# Patient Record
Sex: MALE | URBAN - METROPOLITAN AREA
[De-identification: names, ages, dates, MRNs, and addresses within clinical notes are randomized per-mention and may not be internally consistent; named-entity substitution may affect disease eponyms.]

---

## 2019-09-10 ENCOUNTER — OFFICE VISIT (OUTPATIENT)
Dept: DERMATOLOGY | Facility: CLINIC | Age: 24
End: 2019-09-10
Payer: COMMERCIAL

## 2019-09-10 DIAGNOSIS — L73.0 ACNE NUCHAE KELOIDALIS: Primary | ICD-10-CM

## 2019-09-10 RX ORDER — CLOBETASOL PROPIONATE 0.5 MG/ML
SOLUTION TOPICAL 2 TIMES DAILY
Qty: 50 ML | Refills: 1 | Status: SHIPPED | OUTPATIENT
Start: 2019-09-10

## 2019-09-10 RX ORDER — MINOCYCLINE HYDROCHLORIDE 100 MG/1
100 CAPSULE ORAL 2 TIMES DAILY
Qty: 60 CAPSULE | Refills: 1 | Status: SHIPPED | OUTPATIENT
Start: 2019-09-10

## 2019-09-10 NOTE — PROGRESS NOTES
Teledermatology Consult Note    Image quality and interpretability: acceptable    In-person dermatology visit recommendation: after treatment to ensure response - 1 month    Impression and Recommendations:  Diagnosis 1: Acne keloidalis nuchae  - Start minocycline 100mg BID for 1 month  - Start clobetasol 0.05% solution BID; tapering to daily as condition responds    Patient counseling:  This condition is called acne keloidalis nuchae - it is a form of acne that results in small scars and can eventually cause hair loss on the affected part of the scalp. We will start treatment right away, but want to follow up with your to ensure that we avoid those outcomes. The best treatment consists of an oral antibiotic called minocycline combined with a topical steroid called clobetasol solution. The antibiotic will likely only be a one month commitment, but the steroid may be needed on and off for a long time, as this condition can be chronic. Once it is better under control we may use a topical antibiotic clindamycin, which is safe to use long term.     Follow-up:   Follow-up with dermatology in approximately 1 month. Please, have the patient contact the clinic at [phone] and make an appointment and indicate they are a teledermatology patient.     Thank you for the opportunity be involved in the care of this patient.     Staff:  Jarod Knott MD  Dermatology/Dermatopathology Staff Physician  , Department of Dermatology    _____________________________________________________________________________    Dermatology Problem List:  New patient    Encounter Date: Sep 10, 2019    CC:   Chief Complaint   Patient presents with     Allied Health Visit     telederm       History of Present Illness:  I have reviewed the teledermatology consult information and the referring clinician s clinic note corresponding to this issue. Mayank Weber is a 24 year old male who presents as a teledermatology consult for  "the following:    Auto-populate boxes from the consult order:     When did you notice the rash? 1 year ago      Where did it start - then where did it spread to? Started at nape of neck and going up side of scalp.  Before the rash, had you been sick? No  Had you taken any prescription meds within a month of the rash, if so what? No  Does it itch and if so how bad? Yes moderate  Do you feel sick? No  What did you put on the rash after it started to bother you? \"like a rubbing alcohol\"  Do any of your friends or family members have a similar rash? No  Have you had any kind of skin problems in the past? Yes when born had eczema  Does sensitive skin run in your family? No  Do severe allergies run in your family? No  What do you think might be causing the rash? States [employer] used a rusted razor on him.  Have you noticed any water blisters with the rash? none bumps not filled with fluid  Diameter of some of the spots (range i.e. 2 to 5 mm):   All sites with involvement: nape of neck and scalp    Additional comments and observations from review of the patient s chart including the following:    No additional chart history in this system or Care Everywhere    Physical Examination:  General: Well-appearing, appropriately-developed individual.  Skin: Focused examination of the posterior scalp within the teledermatology photograph(s) was performed.   - scattered folliculocentric papules and pustules with keloidal changes and alopecia on the occiput    Past Medical History:   There is no problem list on file for this patient.    No past medical history on file.  No past surgical history on file.    Social History:  Social History     Socioeconomic History     Marital status: Single     Spouse name: Not on file     Number of children: Not on file     Years of education: Not on file     Highest education level: Not on file   Occupational History     Not on file   Social Needs     Financial resource strain: Not on file     " Food insecurity:     Worry: Not on file     Inability: Not on file     Transportation needs:     Medical: Not on file     Non-medical: Not on file   Tobacco Use     Smoking status: Not on file   Substance and Sexual Activity     Alcohol use: Not on file     Drug use: Not on file     Sexual activity: Not on file   Lifestyle     Physical activity:     Days per week: Not on file     Minutes per session: Not on file     Stress: Not on file   Relationships     Social connections:     Talks on phone: Not on file     Gets together: Not on file     Attends Hinduism service: Not on file     Active member of club or organization: Not on file     Attends meetings of clubs or organizations: Not on file     Relationship status: Not on file     Intimate partner violence:     Fear of current or ex partner: Not on file     Emotionally abused: Not on file     Physically abused: Not on file     Forced sexual activity: Not on file   Other Topics Concern     Not on file   Social History Narrative     Not on file       Family History:  No family history on file.    Medications:  No current outpatient medications on file.     No current facility-administered medications for this visit.           Allergies not on file    _____________________________________________________________________________    Teledermatology information:  - Location of patient: [facility],  Dermatology Clinic, [address], [place] [address])  - Patient presented in referral from: self   - Location of teledermatologist: (Dr. Knott's office - [address] [place], [place] [address])  - Reason teledermatology is appropriate: patient unable to obtain appointment for acute issue (next clinic opening too far away), desires expedited evaluation  - The patient's condition can safely be assessed using telemedicine: yes  - Method of transmission: store and forward teledermatology  - Date of images: 09/10/19  - Date of report: 09/10/19

## 2019-09-10 NOTE — PROGRESS NOTES
"When did you notice the rash? 1 year ago      Where did it start - then where did it spread to? Started at nape of neck and going up side of scalp.  Before the rash, had you been sick? No  Had you taken any prescription meds within a month of the rash, if so what? No  Does it itch and if so how bad? Yes moderate  Do you feel sick? No  What did you put on the rash after it started to bother you? \"like a rubbing alcohol\"  Do any of your friends or family members have a similar rash? No  Have you had any kind of skin problems in the past? Yes when born had eczema  Does sensitive skin run in your family? No  Do severe allergies run in your family? No  What do you think might be causing the rash? [employer] used a rusted razor on him.  Have you noticed any water blisters with the rash? none bumps not filled with fluid  Diameter of some of the spots (range i.e. 2 to 5 mm):   All sites with involvement: nape of neck and scalp                         "

## 2019-09-11 ENCOUNTER — TELEPHONE (OUTPATIENT)
Dept: DERMATOLOGY | Facility: CLINIC | Age: 24
End: 2019-09-11

## 2019-09-11 NOTE — TELEPHONE ENCOUNTER
Pt given Dr Knott' recommendations. States understanding and has no questions at this time. Agrees to contact clinic with any concerns.

## 2019-09-24 ENCOUNTER — OFFICE VISIT (OUTPATIENT)
Dept: INTERNAL MEDICINE | Facility: CLINIC | Age: 24
End: 2019-09-24
Payer: COMMERCIAL

## 2019-09-24 VITALS
OXYGEN SATURATION: 98 % | SYSTOLIC BLOOD PRESSURE: 122 MMHG | HEART RATE: 74 BPM | WEIGHT: 173.7 LBS | DIASTOLIC BLOOD PRESSURE: 84 MMHG

## 2019-09-24 DIAGNOSIS — Z00.00 HEALTH MAINTENANCE EXAMINATION: Primary | ICD-10-CM

## 2019-09-24 DIAGNOSIS — R42 LIGHT-HEADEDNESS: ICD-10-CM

## 2019-09-24 DIAGNOSIS — G44.209 TENSION HEADACHE: ICD-10-CM

## 2019-09-24 SDOH — HEALTH STABILITY: MENTAL HEALTH: HOW OFTEN DO YOU HAVE A DRINK CONTAINING ALCOHOL?: NEVER

## 2019-09-24 ASSESSMENT — PAIN SCALES - GENERAL: PAINLEVEL: NO PAIN (0)

## 2019-09-24 NOTE — NURSING NOTE
Mayank Weber      1.  Has the patient received the information for the influenza vaccine? YES    2.  Does the patient have any of the following contraindications?     Allergy to eggs? No     Allergy to a component of the influenza vaccine? No     Serious reaction to previous influenza vaccines? No     Paralyzed by Guillain-Millbrook syndrome? No     Currently sick today? No         3.  Verified patient name and  prior to injection. Yes  4.  The patient was instructed to wait 15 minutes before leaving the building in the event of an allergic reaction: YES        Vaccination given by LOURDES Hutchins at 10:53 AM sign on 2019      Recorded by Norma Abrams [facility]

## 2019-09-24 NOTE — NURSING NOTE
Chief Complaint   Patient presents with     Establish Care     Pt here to establish care and for a general physical      LOURDES Hutchins at 7:08 AM sign on 9/24/2019

## 2019-09-24 NOTE — PROGRESS NOTES
"  PRIMARY CARE CENTER         HPI:       Mayank Weber is a 24 year old male with no PMH presents to establish care.     Patient is working as an  for [employer] in [employer] in [place]. Made the switch to night time schedule June and started having a lot of headaches and light headedness recently. Hoping to quit [employer] job completely this week, unfortunately would lose his insurance.  Also works part time as a /research analyst in [employer] and hoping this will be moved to a full time position.    Reports headaches upon waking up and has throbbing sensation in head which improves when he drinks water. Drinks 1 cup of tea a day. Does not drink coffee. Feels like he doesn't eat enough. Doesn't think he is drinking enough water. Reports light headedness at the same time when he wakes up. Usually goes away when he drinks water. Has never fainted but has had a few times of feeling light headed when he stands up too quickly.     Has not had chest pain but has had some irregular heart beats. Recently has been happening \"more\". Unsure if associated with light headedness.     Insurance will run out when he quits his job this week.         ROS:     Constitutional, neuro, ENT, endocrine, pulmonary, cardiac, gastrointestinal, genitourinary, musculoskeletal, integument and psychiatric systems are negative, except as otherwise noted.    Problem, Medication and Allergy Lists were reviewed and are current.  Patient is a new patient to this clinic and so  I reviewed/updated the Past Medical History, the Family History and the Social History.     Past Medical History:   Diagnosis Date     Asthma in child      No Known Allergies    History reviewed. No pertinent surgical history.    Family History   Problem Relation Age of Onset     Diabetes Mother      Asthma Maternal Grandfather        Social History     Socioeconomic History     Marital status: Single     Spouse name: Not on file     " Number of children: Not on file     Years of education: Not on file     Highest education level: Not on file   Occupational History     Not on file   Social Needs     Financial resource strain: Not on file     Food insecurity:     Worry: Not on file     Inability: Not on file     Transportation needs:     Medical: Not on file     Non-medical: Not on file   Tobacco Use     Smoking status: Not on file   Substance and Sexual Activity     Alcohol use: Not on file     Drug use: Not on file     Sexual activity: Not on file   Lifestyle     Physical activity:     Days per week: Not on file     Minutes per session: Not on file     Stress: Not on file   Relationships     Social connections:     Talks on phone: Not on file     Gets together: Not on file     Attends Confucianist service: Not on file     Active member of club or organization: Not on file     Attends meetings of clubs or organizations: Not on file     Relationship status: Not on file     Intimate partner violence:     Fear of current or ex partner: Not on file     Emotionally abused: Not on file     Physically abused: Not on file     Forced sexual activity: Not on file   Other Topics Concern     Not on file   Social History Narrative     Not on file     Current Outpatient Medications   Medication     clobetasol (TEMOVATE) 0.05 % external solution     minocycline (MINOCIN/DYNACIN) 100 MG capsule     No current facility-administered medications for this visit.           Physical Exam:   /82 (BP Location: Right arm, Patient Position: Sitting, Cuff Size: Adult Regular)   Pulse 74   Wt 78.8 kg (173 lb 11.2 oz)   SpO2 98%   There is no height or weight on file to calculate BMI.  Vitals were reviewed       GENERAL APPEARANCE: healthy, alert and no distress     EYES: EOMI,  PERRL     HENT: ear canals and TM's normal and nose and mouth without ulcers or lesions     NECK: no adenopathy, no asymmetry, masses, or scars      RESP: lungs clear to auscultation - no rales,  rhonchi or wheezes     CV: regular rates and rhythm, normal S1 S2, no S3 or S4 and no murmur, click or rub     ABDOMEN:  soft, nontender, no HSM or masses and bowel sounds normal     MS: extremities normal- no gross deformities noted, no evidence of inflammation in joints, FROM in all extremities.     SKIN: no suspicious lesions or rashes     NEURO: Normal strength and tone, sensory exam grossly normal, mentation intact and speech normal     PSYCH: mentation appears normal. and affect normal/bright     LYMPHATICS: No cervical adenopathy      Results:     n/a    Assessment and Plan     Mayank was seen today for establish care and headache.    Diagnoses and all orders for this visit:    Health maintenance examination  -     C FLU VAC QUADRIVALENT SPLIT VIRUS 6-35 MO IM  -     TDAP VACCINE (BOOSTRIX)    Tension headache  Light-headedness  Patient reports new headache and intermittent light headedness that coincides with his switch to the night shift of work with reduction in how much he eats and stays hydrated. Patient reports his symptoms improve as he drinks water throughout the course of the day. Encouraged patient to stay hydrated and carry a water bottle at work so he remembers to drink water.  Patient with negative orthostatic vitals in clinic today. Patient reports intermittent palpations of unclear etiology and he has not noticed an association with his light headedness. Denies any near syncope or syncopal events. Encouraged patient to return to clinic for further evaluation especially if his headache is persistent despite adequate hydration, worsening light headedness or syncopal episode, or new chest pain or sustained palpation. Patient understands these instructions.     Options for treatment and follow-up care were reviewed with the patient. Mayank Weber engaged in the decision making process and verbalized understanding of the options discussed and agreed with the final plan.    Donna Turcios MD  PhD  Internal Medicine, PGY-3  [phone]   Sep 24, 2019    Pt was seen and plan of care discussed with Dr. Chang.     Attending Addendum:  Patient seen and examined with resident in clinic today.  Pertinent portions of history and exam were independently verified by myself.  I agree with the exam and plan as outlined above with the following modifications: none.  Vee Chang MD  Internal Medicine

## 2019-09-24 NOTE — NURSING NOTE
Patient received TDAP vaccine. Vaccine was given under the supervision of Dr. Chang. See immunization list for administration details. Pt was advised to remain in [facility] lobby for 15 minutes in case of an averse reaction. Given by Norma Abrams, CMA, EMT 10:53 AM 9/24/2019